# Patient Record
Sex: FEMALE | Race: WHITE | NOT HISPANIC OR LATINO | Employment: STUDENT | URBAN - METROPOLITAN AREA
[De-identification: names, ages, dates, MRNs, and addresses within clinical notes are randomized per-mention and may not be internally consistent; named-entity substitution may affect disease eponyms.]

---

## 2018-12-07 ENCOUNTER — APPOINTMENT (OUTPATIENT)
Dept: LAB | Facility: HOSPITAL | Age: 18
End: 2018-12-07
Attending: PREVENTIVE MEDICINE

## 2018-12-07 ENCOUNTER — APPOINTMENT (OUTPATIENT)
Dept: OCCUPATIONAL MEDICINE | Facility: CLINIC | Age: 18
End: 2018-12-07

## 2018-12-07 ENCOUNTER — TRANSCRIBE ORDERS (OUTPATIENT)
Dept: OCCUPATIONAL MEDICINE | Facility: CLINIC | Age: 18
End: 2018-12-07

## 2018-12-07 DIAGNOSIS — Z00.8 SPECIAL EXAM: Primary | ICD-10-CM

## 2018-12-07 DIAGNOSIS — Z00.8 SPECIAL EXAM: ICD-10-CM

## 2018-12-07 PROCEDURE — 86480 TB TEST CELL IMMUN MEASURE: CPT

## 2018-12-07 PROCEDURE — 36415 COLL VENOUS BLD VENIPUNCTURE: CPT

## 2018-12-11 LAB
GAMMA INTERFERON BACKGROUND BLD IA-ACNC: 0.02 IU/ML
M TB IFN-G BLD-IMP: NEGATIVE
M TB IFN-G CD4+ BCKGRND COR BLD-ACNC: 0 IU/ML
M TB IFN-G CD4+ BCKGRND COR BLD-ACNC: 0.01 IU/ML
MITOGEN IGNF BCKGRD COR BLD-ACNC: >10 IU/ML

## 2020-10-30 ENCOUNTER — OFFICE VISIT (OUTPATIENT)
Dept: OBGYN CLINIC | Facility: MEDICAL CENTER | Age: 20
End: 2020-10-30
Payer: COMMERCIAL

## 2020-10-30 VITALS
WEIGHT: 164 LBS | BODY MASS INDEX: 28 KG/M2 | SYSTOLIC BLOOD PRESSURE: 134 MMHG | DIASTOLIC BLOOD PRESSURE: 82 MMHG | HEIGHT: 64 IN

## 2020-10-30 DIAGNOSIS — N92.6 IRREGULAR PERIODS/MENSTRUAL CYCLES: Primary | ICD-10-CM

## 2020-10-30 DIAGNOSIS — Z01.419 ENCOUNTER FOR GYNECOLOGICAL EXAMINATION WITHOUT ABNORMAL FINDING: ICD-10-CM

## 2020-10-30 PROCEDURE — G0145 SCR C/V CYTO,THINLAYER,RESCR: HCPCS | Performed by: STUDENT IN AN ORGANIZED HEALTH CARE EDUCATION/TRAINING PROGRAM

## 2020-10-30 PROCEDURE — 99385 PREV VISIT NEW AGE 18-39: CPT | Performed by: STUDENT IN AN ORGANIZED HEALTH CARE EDUCATION/TRAINING PROGRAM

## 2020-10-30 RX ORDER — MONTELUKAST SODIUM 10 MG/1
10 TABLET ORAL
COMMUNITY

## 2020-10-30 RX ORDER — CLINDAMYCIN PHOSPHATE 10 MG/ML
SOLUTION TOPICAL
COMMUNITY
Start: 2020-10-06

## 2020-10-30 RX ORDER — FLUTICASONE FUROATE AND VILANTEROL TRIFENATATE 100; 25 UG/1; UG/1
POWDER RESPIRATORY (INHALATION)
COMMUNITY
Start: 2020-09-15

## 2020-10-30 RX ORDER — NORETHINDRONE ACETATE AND ETHINYL ESTRADIOL 1MG-20(21)
1 KIT ORAL DAILY
Qty: 28 TABLET | Refills: 11 | Status: SHIPPED | OUTPATIENT
Start: 2020-10-30

## 2020-11-05 LAB
LAB AP GYN PRIMARY INTERPRETATION: NORMAL
Lab: NORMAL

## 2020-11-13 ENCOUNTER — HOSPITAL ENCOUNTER (OUTPATIENT)
Dept: ULTRASOUND IMAGING | Facility: HOSPITAL | Age: 20
Discharge: HOME | End: 2020-11-13
Attending: STUDENT IN AN ORGANIZED HEALTH CARE EDUCATION/TRAINING PROGRAM
Payer: COMMERCIAL

## 2020-11-13 DIAGNOSIS — N92.6 IRREGULAR PERIODS/MENSTRUAL CYCLES: ICD-10-CM

## 2020-11-13 PROCEDURE — 76856 US EXAM PELVIC COMPLETE: CPT

## 2020-11-13 PROCEDURE — 76830 TRANSVAGINAL US NON-OB: CPT

## 2020-11-17 ENCOUNTER — TELEPHONE (OUTPATIENT)
Dept: OBGYN CLINIC | Facility: CLINIC | Age: 20
End: 2020-11-17

## 2020-11-17 DIAGNOSIS — N92.6 IRREGULAR PERIODS/MENSTRUAL CYCLES: Primary | ICD-10-CM

## 2021-01-21 DIAGNOSIS — N92.6 IRREGULAR PERIODS/MENSTRUAL CYCLES: ICD-10-CM

## 2021-01-25 DIAGNOSIS — N92.6 IRREGULAR PERIODS/MENSTRUAL CYCLES: ICD-10-CM

## 2021-06-16 DIAGNOSIS — N92.6 IRREGULAR PERIODS/MENSTRUAL CYCLES: ICD-10-CM

## 2021-06-16 RX ORDER — NORGESTREL-ETHINYL ESTRADIOL 0.3-0.03MG
TABLET ORAL
Qty: 84 TABLET | Refills: 1 | Status: SHIPPED | OUTPATIENT
Start: 2021-06-16 | End: 2021-11-08

## 2021-11-08 DIAGNOSIS — N92.6 IRREGULAR PERIODS/MENSTRUAL CYCLES: ICD-10-CM

## 2021-11-08 RX ORDER — NORGESTREL-ETHINYL ESTRADIOL 0.3-0.03MG
TABLET ORAL
Qty: 84 TABLET | Refills: 1 | Status: SHIPPED | OUTPATIENT
Start: 2021-11-08

## 2021-12-10 ENCOUNTER — ANNUAL EXAM (OUTPATIENT)
Dept: OBGYN CLINIC | Facility: MEDICAL CENTER | Age: 21
End: 2021-12-10
Payer: COMMERCIAL

## 2021-12-10 VITALS
DIASTOLIC BLOOD PRESSURE: 72 MMHG | HEIGHT: 64 IN | SYSTOLIC BLOOD PRESSURE: 110 MMHG | BODY MASS INDEX: 27.96 KG/M2 | WEIGHT: 163.8 LBS

## 2021-12-10 DIAGNOSIS — Z01.419 ENCOUNTER FOR GYNECOLOGICAL EXAMINATION WITHOUT ABNORMAL FINDING: Primary | ICD-10-CM

## 2021-12-10 DIAGNOSIS — Z31.69 ENCOUNTER FOR PRECONCEPTION CONSULTATION: ICD-10-CM

## 2021-12-10 PROBLEM — J45.909 ASTHMA: Status: ACTIVE | Noted: 2018-01-23

## 2021-12-10 PROCEDURE — 99395 PREV VISIT EST AGE 18-39: CPT | Performed by: STUDENT IN AN ORGANIZED HEALTH CARE EDUCATION/TRAINING PROGRAM

## 2022-07-04 ENCOUNTER — OFFICE VISIT (OUTPATIENT)
Dept: URGENT CARE | Facility: CLINIC | Age: 22
End: 2022-07-04
Payer: COMMERCIAL

## 2022-07-04 VITALS
SYSTOLIC BLOOD PRESSURE: 108 MMHG | TEMPERATURE: 98.7 F | OXYGEN SATURATION: 99 % | RESPIRATION RATE: 16 BRPM | HEART RATE: 74 BPM | DIASTOLIC BLOOD PRESSURE: 72 MMHG

## 2022-07-04 DIAGNOSIS — L02.11 ABSCESS OF NECK: Primary | ICD-10-CM

## 2022-07-04 PROCEDURE — 99213 OFFICE O/P EST LOW 20 MIN: CPT

## 2022-07-04 PROCEDURE — S9088 SERVICES PROVIDED IN URGENT: HCPCS

## 2022-07-04 RX ORDER — CEPHALEXIN 500 MG/1
500 CAPSULE ORAL EVERY 6 HOURS SCHEDULED
Qty: 28 CAPSULE | Refills: 0 | Status: SHIPPED | OUTPATIENT
Start: 2022-07-04 | End: 2022-07-04 | Stop reason: CLARIF

## 2022-07-04 RX ORDER — CEPHALEXIN 500 MG/1
500 CAPSULE ORAL EVERY 6 HOURS SCHEDULED
Qty: 28 CAPSULE | Refills: 0 | Status: SHIPPED | OUTPATIENT
Start: 2022-07-04 | End: 2022-07-11

## 2022-07-04 NOTE — PATIENT INSTRUCTIONS
Start Cephalexin  Tylenol and Motrin OTC for pain  Monitor for signs of worsening infection such as increased pain, redness, swelling, fever, or no improvement in 2-3 days  Follow up with Primary Care Physician  Return to clinic or go to the nearest Emergency Department with new or worsening symptoms as discussed  Abscess   AMBULATORY CARE:   An abscess  is an area under the skin where pus (infected fluid) collects  An abscess is often caused by bacteria, fungi or other germs that get into an open wound  You can get an abscess anywhere on your body  Common signs and symptoms of an abscess: You may have a swollen mass that is red and painful  Pus may leak out of the mass  The pus will be white or yellow and may smell bad  You may have redness and pain days before the mass appears  You may have a fever and chills if the infection spreads  Seek immediate care if:   The area around your abscess becomes very painful, warm, or has red streaks  You have a fever and chills  Your heart is beating faster than usual     You feel faint or confused  Call your doctor if:   Your abscess gets bigger or does not get better  Your abscess returns  You have questions or concerns about your condition or care  Treatment for an abscess: Your healthcare provider may need to make a cut in the abscess to allow the pus to drain  You may need surgery to remove your abscess  You may  need any of the following:  Antibiotics  help treat a bacterial infection  Acetaminophen  decreases pain and fever  It is available without a doctor's order  Ask how much to take and how often to take it  Follow directions  Read the labels of all other medicines you are using to see if they also contain acetaminophen, or ask your doctor or pharmacist  Acetaminophen can cause liver damage if not taken correctly  Do not use more than 4 grams (4,000 milligrams) total of acetaminophen in one day       NSAIDs , such as ibuprofen, help decrease swelling, pain, and fever  This medicine is available with or without a doctor's order  NSAIDs can cause stomach bleeding or kidney problems in certain people  If you take blood thinner medicine, always ask your healthcare provider if NSAIDs are safe for you  Always read the medicine label and follow directions  Take your medicine as directed  Contact your healthcare provider if you think your medicine is not helping or if you have side effects  Tell him or her if you are allergic to any medicine  Keep a list of the medicines, vitamins, and herbs you take  Include the amounts, and when and why you take them  Bring the list or the pill bottles to follow-up visits  Carry your medicine list with you in case of an emergency  Self-care:   Apply a warm compress to your abscess  This will help it open and drain  Wet a washcloth in warm, but not hot, water  Apply the compress for 10 minutes  Repeat this 4 times each day  Do not  press on an abscess or try to open it with a needle  You may push the bacteria deeper or into your blood  Do not share your clothes, towels, or sheets with anyone  This can spread the infection to others  Wash your hands often  This can help prevent the spread of germs  Use soap and water or an alcohol-based hand rub  Care for your wound after it is drained:   Care for your wound as directed  If your healthcare provider says it is okay, carefully remove the bandage and gauze packing  You may need to soak the gauze to get it out of your wound  Clean your wound and the area around it as directed  Dry the area and put on new, clean bandages  Change your bandages when they get wet or dirty  Ask your healthcare provider how to change the gauze in your wound  Keep track of how many pieces of gauze are placed inside the wound  Do not put too much packing in the wound  Do not pack the gauze too tightly in your wound      Follow up with your healthcare provider in 1 to 3 days:  You may need to have your packing removed or your bandage changed  Write down your questions so you remember to ask them during your visits  © Copyright [employer] 2022 Information is for End User's use only and may not be sold, redistributed or otherwise used for commercial purposes  All illustrations and images included in CareNotes® are the copyrighted property of [employer]  or [facility] Abdulkadir De La Paz   The above information is an  only  It is not intended as medical advice for individual conditions or treatments  Talk to your doctor, nurse or pharmacist before following any medical regimen to see if it is safe and effective for you

## 2022-07-04 NOTE — PROGRESS NOTES
[address] [facility]        NAME: Gena Stokes is a 19 y o  female  : 2000    MRN: [id number]  DATE: 2022  TIME: 8:48 AM    Assessment and Plan   Abscess of neck [L02 11]  1  Abscess of neck  cephalexin (KEFLEX) 500 mg capsule     Physical exam reveals dime sized swollen area to base of R posterior neck at hairline  Possible enlarged lymph node vs infected hair follicle  Patient to apply warm compresses and take keflex  Patient Instructions     Warm compresses  Start Cephalexin  Tylenol and Motrin OTC for pain  Monitor for signs of worsening infection such as increased pain, redness, swelling, fever, or no improvement in 2-3 days  Follow up with Primary Care Physician  Return to clinic or go to the nearest Emergency Department with new or worsening symptoms as discussed  Chief Complaint     Chief Complaint   Patient presents with    Neck Pain     Lump on back of right side of neck x 5 days  Noted reddened raised warm area on neck  No fever  Painful  History of Present Illness       The patient presents today with complaints of a painful lump to the back right side of her neck in her hairline that started on Thurs  She states the lump started out small and has grown in size  She states it is not itchy, and denies fevers/chills, nasal congestion, sore throat, body aches, cough  Review of Systems   Review of Systems   Constitutional: Negative for chills, diaphoresis, fatigue and fever  HENT: Negative for congestion, ear pain, rhinorrhea, sinus pressure and voice change  Eyes: Negative  Respiratory: Negative for cough, chest tightness and shortness of breath  Cardiovascular: Negative for chest pain and palpitations  Gastrointestinal: Negative for constipation, diarrhea, nausea and vomiting  Endocrine: Negative  Genitourinary: Negative for dysuria  Musculoskeletal: Negative for back pain, myalgias and neck pain     Skin: Positive for color change (raised are on back R side of neck)  Negative for pallor and rash  Allergic/Immunologic: Negative  Neurological: Negative for dizziness, syncope and headaches  Hematological: Negative  Psychiatric/Behavioral: Negative  Current Medications       Current Outpatient Medications:     Breo Ellipta 100-25 MCG/INH inhaler, , Disp: , Rfl:     cephalexin (KEFLEX) 500 mg capsule, Take 1 capsule (500 mg total) by mouth every 6 (six) hours for 7 days, Disp: 28 capsule, Rfl: 0    clindamycin (CLEOCIN T) 1 %, Apply topically, Disp: , Rfl:     montelukast (SINGULAIR) 10 mg tablet, Take 10 mg by mouth daily at bedtime, Disp: , Rfl:     Ventolin  (90 Base) MCG/ACT inhaler, , Disp: , Rfl:     Cryselle-28 0 3-30 MG-MCG per tablet, TAKE ONE TABLET BY MOUTH DAILY (Patient not taking: Reported on 12/10/2021), Disp: 84 tablet, Rfl: 1    norethindrone-ethinyl estradiol (JUNEL FE 1/20) 1-20 MG-MCG per tablet, Take 1 tablet by mouth daily (Patient not taking: Reported on 12/10/2021 ), Disp: 28 tablet, Rfl: 11    Current Allergies     Allergies as of 07/04/2022    (No Known Allergies)            The following portions of the patient's history were reviewed and updated as appropriate: allergies, current medications, past family history, past medical history, past social history, past surgical history and problem list      Past Medical History:   Diagnosis Date    Asthma     Urinary tract infection     Varicella        Past Surgical History:   Procedure Laterality Date    TYMPANOSTOMY TUBE PLACEMENT      WISDOM TOOTH EXTRACTION         Family History   Problem Relation Age of Onset    Breast cancer Neg Hx     Colon cancer Neg Hx     Ovarian cancer Neg Hx     Cervical cancer Neg Hx     Uterine cancer Neg Hx          Medications have been verified  Objective   /72   Pulse 74   Temp 98 7 °F (37 1 °C)   Resp 16   SpO2 99%        Physical Exam     Physical Exam  Vitals and nursing note reviewed  Constitutional:       General: She is not in acute distress  Appearance: She is well-developed  She is not ill-appearing or diaphoretic  HENT:      Head: Normocephalic and atraumatic  Right Ear: External ear normal       Left Ear: External ear normal       Nose: Nose normal       Mouth/Throat:      Pharynx: No oropharyngeal exudate or posterior oropharyngeal erythema  Eyes:      General: No scleral icterus  Right eye: No discharge  Left eye: No discharge  Extraocular Movements: Extraocular movements intact  Conjunctiva/sclera: Conjunctivae normal       Pupils: Pupils are equal, round, and reactive to light  Cardiovascular:      Rate and Rhythm: Normal rate and regular rhythm  Pulses: Normal pulses  Heart sounds: Normal heart sounds  Pulmonary:      Effort: Pulmonary effort is normal  No respiratory distress  Breath sounds: Normal breath sounds  No wheezing or rales  Abdominal:      General: Bowel sounds are normal    Musculoskeletal:         General: No deformity  Normal range of motion  Cervical back: Normal range of motion and neck supple  Lymphadenopathy:      Cervical: Cervical adenopathy (R posterior side of neck) present  Skin:     General: Skin is warm  Findings: No rash  Comments: Dime sized swollen area to base of R posterior neck at hairline  Area is tender without redness, warmth, or drainage  Neurological:      Mental Status: She is alert and oriented to person, place, and time     Psychiatric:         Mood and Affect: Mood normal          Behavior: Behavior normal

## 2022-12-02 ENCOUNTER — PATIENT MESSAGE (OUTPATIENT)
Dept: OBGYN CLINIC | Facility: MEDICAL CENTER | Age: 22
End: 2022-12-02

## 2022-12-02 DIAGNOSIS — N97.9 INFERTILITY, FEMALE, PRIMARY: Primary | ICD-10-CM

## 2022-12-28 ENCOUNTER — TELEPHONE (OUTPATIENT)
Dept: OBGYN CLINIC | Facility: CLINIC | Age: 22
End: 2022-12-28

## 2022-12-28 DIAGNOSIS — N97.9 PRIMARY FEMALE INFERTILITY: Primary | ICD-10-CM

## 2022-12-28 NOTE — TELEPHONE ENCOUNTER
----- Message from Ozzie Guerra sent at 12/28/2022  1:38 PM EST -----  Regarding: re  Contact: [phone]  That’s the order I used at [facility] yesterday for the lab work  I’ll need another one

## 2022-12-28 NOTE — TELEPHONE ENCOUNTER
----- Message from Ian Grant sent at 12/28/2022  1:27 PM EST -----  Regarding: pre preg  Contact: [phone]  That would be great  If you can please place that order I would greatly appreciate it!

## 2023-01-05 NOTE — TELEPHONE ENCOUNTER
From: Yumiko Calle  To: Brian Abda  Sent: 12/2/2022 12:33 PM EST  Subject: pre preg    Congrats on both parts! A couple questions for you  Are you still having monthly regular cycles  How frequent? What is their length and flow like  Have you ever done over the counter ovulation predictor kits? If so do they turn positive when you expect? Are you on a prenatal vitamin? Has your  ever fathered other pregnancies? Any new medications or diagnoses since I saw you last? I know you have several asthma medications  Thanks for the updates   Once I know these answers we can do an initial blood work evaluation prior to your annual

## 2023-02-01 ENCOUNTER — TELEPHONE (OUTPATIENT)
Dept: OBGYN CLINIC | Facility: CLINIC | Age: 23
End: 2023-02-01

## 2023-02-01 NOTE — TELEPHONE ENCOUNTER
----- Message from Ozzie Guerra sent at 2/1/2023 10:12 AM EST -----  Regarding: pre preg  Contact: [phone]  Good morning! I just saw my PCP for abnormal bleeding  I’m not scheduled to get my period until Feb 10th or around that date  I went to the bathroom a short time ago and had a lot of blood, very clotty  Normally my period is always right on track  I just did a urine test in office, it was negative but the physician thinks it would be too soon for it to be positive  She ordered for a vaginal ultrasound but I wanted to get your opinion  Thank you!